# Patient Record
Sex: FEMALE | Race: BLACK OR AFRICAN AMERICAN | NOT HISPANIC OR LATINO | URBAN - METROPOLITAN AREA
[De-identification: names, ages, dates, MRNs, and addresses within clinical notes are randomized per-mention and may not be internally consistent; named-entity substitution may affect disease eponyms.]

---

## 2021-12-20 ENCOUNTER — INPATIENT (INPATIENT)
Facility: HOSPITAL | Age: 61
LOS: 1 days | Discharge: HOME | DRG: 313 | End: 2021-12-22
Attending: HOSPITALIST | Admitting: HOSPITALIST
Payer: MEDICAID

## 2021-12-20 VITALS
DIASTOLIC BLOOD PRESSURE: 89 MMHG | HEART RATE: 101 BPM | SYSTOLIC BLOOD PRESSURE: 137 MMHG | OXYGEN SATURATION: 99 % | HEIGHT: 64 IN | TEMPERATURE: 98 F | RESPIRATION RATE: 20 BRPM | WEIGHT: 199.96 LBS

## 2021-12-20 DIAGNOSIS — Z98.890 OTHER SPECIFIED POSTPROCEDURAL STATES: Chronic | ICD-10-CM

## 2021-12-20 LAB
ALBUMIN SERPL ELPH-MCNC: 4.6 G/DL — SIGNIFICANT CHANGE UP (ref 3.3–5)
ALP SERPL-CCNC: 113 U/L — SIGNIFICANT CHANGE UP (ref 40–120)
ALT FLD-CCNC: 40 U/L — SIGNIFICANT CHANGE UP (ref 10–45)
ANION GAP SERPL CALC-SCNC: 14 MMOL/L — SIGNIFICANT CHANGE UP (ref 5–17)
APTT BLD: 34.3 SEC — SIGNIFICANT CHANGE UP (ref 27.5–35.5)
AST SERPL-CCNC: 36 U/L — SIGNIFICANT CHANGE UP (ref 10–40)
BASOPHILS # BLD AUTO: 0.03 K/UL — SIGNIFICANT CHANGE UP (ref 0–0.2)
BASOPHILS NFR BLD AUTO: 0.4 % — SIGNIFICANT CHANGE UP (ref 0–2)
BILIRUB SERPL-MCNC: 0.2 MG/DL — SIGNIFICANT CHANGE UP (ref 0.2–1.2)
BUN SERPL-MCNC: 22 MG/DL — SIGNIFICANT CHANGE UP (ref 7–23)
CALCIUM SERPL-MCNC: 10 MG/DL — SIGNIFICANT CHANGE UP (ref 8.4–10.5)
CHLORIDE SERPL-SCNC: 99 MMOL/L — SIGNIFICANT CHANGE UP (ref 96–108)
CO2 SERPL-SCNC: 27 MMOL/L — SIGNIFICANT CHANGE UP (ref 22–31)
CREAT SERPL-MCNC: 1.42 MG/DL — HIGH (ref 0.5–1.3)
EOSINOPHIL # BLD AUTO: 0.22 K/UL — SIGNIFICANT CHANGE UP (ref 0–0.5)
EOSINOPHIL NFR BLD AUTO: 3 % — SIGNIFICANT CHANGE UP (ref 0–6)
GLUCOSE SERPL-MCNC: 95 MG/DL — SIGNIFICANT CHANGE UP (ref 70–99)
HCT VFR BLD CALC: 38.3 % — SIGNIFICANT CHANGE UP (ref 34.5–45)
HGB BLD-MCNC: 12.4 G/DL — SIGNIFICANT CHANGE UP (ref 11.5–15.5)
IMM GRANULOCYTES NFR BLD AUTO: 0.3 % — SIGNIFICANT CHANGE UP (ref 0–1.5)
INR BLD: 1.05 RATIO — SIGNIFICANT CHANGE UP (ref 0.88–1.16)
LYMPHOCYTES # BLD AUTO: 1.43 K/UL — SIGNIFICANT CHANGE UP (ref 1–3.3)
LYMPHOCYTES # BLD AUTO: 19.8 % — SIGNIFICANT CHANGE UP (ref 13–44)
MCHC RBC-ENTMCNC: 31.2 PG — SIGNIFICANT CHANGE UP (ref 27–34)
MCHC RBC-ENTMCNC: 32.4 GM/DL — SIGNIFICANT CHANGE UP (ref 32–36)
MCV RBC AUTO: 96.5 FL — SIGNIFICANT CHANGE UP (ref 80–100)
MONOCYTES # BLD AUTO: 0.6 K/UL — SIGNIFICANT CHANGE UP (ref 0–0.9)
MONOCYTES NFR BLD AUTO: 8.3 % — SIGNIFICANT CHANGE UP (ref 2–14)
NEUTROPHILS # BLD AUTO: 4.93 K/UL — SIGNIFICANT CHANGE UP (ref 1.8–7.4)
NEUTROPHILS NFR BLD AUTO: 68.2 % — SIGNIFICANT CHANGE UP (ref 43–77)
NRBC # BLD: 0 /100 WBCS — SIGNIFICANT CHANGE UP (ref 0–0)
PLATELET # BLD AUTO: 230 K/UL — SIGNIFICANT CHANGE UP (ref 150–400)
POTASSIUM SERPL-MCNC: 4.1 MMOL/L — SIGNIFICANT CHANGE UP (ref 3.5–5.3)
POTASSIUM SERPL-SCNC: 4.1 MMOL/L — SIGNIFICANT CHANGE UP (ref 3.5–5.3)
PROT SERPL-MCNC: 7.5 G/DL — SIGNIFICANT CHANGE UP (ref 6–8.3)
PROTHROM AB SERPL-ACNC: 12.6 SEC — SIGNIFICANT CHANGE UP (ref 10.6–13.6)
RBC # BLD: 3.97 M/UL — SIGNIFICANT CHANGE UP (ref 3.8–5.2)
RBC # FLD: 13.2 % — SIGNIFICANT CHANGE UP (ref 10.3–14.5)
SARS-COV-2 RNA SPEC QL NAA+PROBE: SIGNIFICANT CHANGE UP
SODIUM SERPL-SCNC: 140 MMOL/L — SIGNIFICANT CHANGE UP (ref 135–145)
TROPONIN T, HIGH SENSITIVITY RESULT: 19 NG/L — SIGNIFICANT CHANGE UP (ref 0–51)
TROPONIN T, HIGH SENSITIVITY RESULT: 19 NG/L — SIGNIFICANT CHANGE UP (ref 0–51)
WBC # BLD: 7.23 K/UL — SIGNIFICANT CHANGE UP (ref 3.8–10.5)
WBC # FLD AUTO: 7.23 K/UL — SIGNIFICANT CHANGE UP (ref 3.8–10.5)

## 2021-12-20 PROCEDURE — 93010 ELECTROCARDIOGRAM REPORT: CPT

## 2021-12-20 PROCEDURE — 71045 X-RAY EXAM CHEST 1 VIEW: CPT | Mod: 26

## 2021-12-20 PROCEDURE — 71275 CT ANGIOGRAPHY CHEST: CPT | Mod: 26,MA

## 2021-12-20 PROCEDURE — 99285 EMERGENCY DEPT VISIT HI MDM: CPT

## 2021-12-20 RX ORDER — DIPHENHYDRAMINE HCL 50 MG
50 CAPSULE ORAL ONCE
Refills: 0 | Status: COMPLETED | OUTPATIENT
Start: 2021-12-20 | End: 2021-12-20

## 2021-12-20 RX ORDER — ASPIRIN/CALCIUM CARB/MAGNESIUM 324 MG
324 TABLET ORAL ONCE
Refills: 0 | Status: COMPLETED | OUTPATIENT
Start: 2021-12-20 | End: 2021-12-20

## 2021-12-20 RX ADMIN — Medication 50 MILLIGRAM(S): at 19:55

## 2021-12-20 RX ADMIN — Medication 324 MILLIGRAM(S): at 16:43

## 2021-12-20 RX ADMIN — Medication 40 MILLIGRAM(S): at 16:38

## 2021-12-20 NOTE — ED ADULT TRIAGE NOTE - CHIEF COMPLAINT QUOTE
palpitations 2 days ago. went to urgent care today & instructed to come. Seen & checked in [facility] last night.

## 2021-12-20 NOTE — ED PROVIDER NOTE - ATTENDING CONTRIBUTION TO CARE
Private Physician Abram Khan  61y female pmh  HTN, Asthma, Lung Ca 2020 sp resection/rt/chemo. No dm,hld, PT comes to ed c/o palps and chest pain during night two days ago rad to rt shoulder and arm, Lasted 3h w shortness of breath, with slight cough no fever, +diaphoresis. Pt seen at OSH and left before gutierrez complete. No feels Ok. PE WDWN female awake alert normocephalic atraumatic neck supple chest clear anterior & posterior cv no rubs, gallops or murmurs abd soft +bs neuro no focal defects  Doyle Cintron MD, Facep

## 2021-12-20 NOTE — ED PROVIDER NOTE - PROGRESS NOTE DETAILS
Endorsed to Dr LAURI Cintron MD, Facep Patient signed out to me by the prior attending.  The patient's disposition was discussed with the treating team and agreed upon.  Doyle Alegria M.D. (attending) will admit secondary to chest pain and sweating with ekg changes.   Patient endorsed to the medical team at the time of admission. Based on patient's history and physical exam, as well as the results of today's workup, I feel that patient warrants admission to the hospital for further workup/evaluation and continued management. I discussed the findings of today's workup with the patient and addressed the patient's questions and concerns. The patient was agreeable with admission. Our team spoke with the inpatient receiving team who accepted the patient for admission and subsequently took over the patient's care at the time of admission. The receiving team will follow up on pending labs, analgesia, any clinical imaging results, ancillary findings, reassess, and disposition as clinically indicated. Details of patient and plan conveyed to receiving physician team and conveyed back for understanding. There were no questions at this time about the patient's status, disposition, and plan. Patient's care to be taken over by receiving physician team at this time, all decisions regarding the progression of care will be made at their discretion.

## 2021-12-20 NOTE — ED ADULT NURSE NOTE - CHIEF COMPLAINT QUOTE
palpitations 2 days ago. went to urgent care today & instructed to come. Seen & checked in [facility] last night.

## 2021-12-20 NOTE — ED PROVIDER NOTE - NS ED ROS FT
GENERAL: No fever, chills  EYES: no vision changes, no discharge.   ENT: no difficulty swallowing or speaking   CARDIAC: + chest pain, palpitations, + SOB, lower extremity swelling  PULMONARY: + cough, +SOB  GI: no abdominal pain, n/v/d  : no dysuria, no hematuria  SKIN: no rashes, no ecchymosis  NEURO: no headache, lightheadedness  MSK: No joint pain, myalgia, weakness.

## 2021-12-20 NOTE — ED PROVIDER NOTE - CLINICAL SUMMARY MEDICAL DECISION MAKING FREE TEXT BOX
62 yo F hx of HTN, HLD, Asthma, lung CA sp resection and chemo, pw palpitations and chest pain that woke her up from sleep two nights ago. No significant on exam, but concerning for ACS vs PE, lower suspicion for PNA, COVID. Due to contrast allergy, will pre-medicate and obtain CTA chest. Likely tele admission vs obs for further cardiac testing.

## 2021-12-20 NOTE — ED ADULT NURSE NOTE - OBJECTIVE STATEMENT
61 yr old female amb to Ed with c/o palpitations x 2 days ago Pt says she was asleep and that the palpitations woke her up. C/o feeling clammy with min rt sided chest pain under rt breast rad rad rt shoulder. Has not had an episode since.  Denies smoking or etoh has hx Lung CA 2020 with surgery radiation and chemo. Has hx Asthma Was seen that day at urgent care had abnorm EKG went to [facility] and did not stay for CT of chest. Denies abd pain denies burn or diff urinating. Vaccinatedx2 has not had covid Denies recent travel.

## 2021-12-20 NOTE — ED PROVIDER NOTE - OBJECTIVE STATEMENT
60 yo F hx of HTN, HLD, Asthma, lung CA sp resection and chemo, pw palpitations and chest pain that woke her up from sleep two nights ago. CP was severe, sharp, radiated to right shoulder, associated with SOB, diaphoresis, self resolved after 3 hours. Pt endorse mild cough. Pt denies fever, chills, Gi, Gu symptoms. Pt was seen at outside ED, but left AMA.

## 2021-12-20 NOTE — ED PROVIDER NOTE - PHYSICAL EXAMINATION
GEN: Patient awake alert NAD.   HEENT: normocephalic, atraumatic, EOMI, no scleral icterus  CARDIAC: RRR, S1, S2, no murmur.   PULM: CTA B/L no wheeze, rhonchi, rales.   ABD: soft NT, ND, no rebound no guarding  MSK: Moving all extremities, no edema. 5/5 strength and full ROM in all extremities.   NEURO: A&Ox3, no focal neurological deficits, CN 2-12 grossly intact  SKIN: warm, dry, no rash.

## 2021-12-21 DIAGNOSIS — I20.0 UNSTABLE ANGINA: ICD-10-CM

## 2021-12-21 DIAGNOSIS — N17.9 ACUTE KIDNEY FAILURE, UNSPECIFIED: ICD-10-CM

## 2021-12-21 DIAGNOSIS — R07.9 CHEST PAIN, UNSPECIFIED: ICD-10-CM

## 2021-12-21 DIAGNOSIS — R09.89 OTHER SPECIFIED SYMPTOMS AND SIGNS INVOLVING THE CIRCULATORY AND RESPIRATORY SYSTEMS: ICD-10-CM

## 2021-12-21 DIAGNOSIS — J45.909 UNSPECIFIED ASTHMA, UNCOMPLICATED: ICD-10-CM

## 2021-12-21 DIAGNOSIS — I10 ESSENTIAL (PRIMARY) HYPERTENSION: ICD-10-CM

## 2021-12-21 DIAGNOSIS — C34.90 MALIGNANT NEOPLASM OF UNSPECIFIED PART OF UNSPECIFIED BRONCHUS OR LUNG: ICD-10-CM

## 2021-12-21 DIAGNOSIS — Z29.9 ENCOUNTER FOR PROPHYLACTIC MEASURES, UNSPECIFIED: ICD-10-CM

## 2021-12-21 LAB
ALBUMIN SERPL ELPH-MCNC: 4.1 G/DL — SIGNIFICANT CHANGE UP (ref 3.3–5)
ALP SERPL-CCNC: 105 U/L — SIGNIFICANT CHANGE UP (ref 40–120)
ALT FLD-CCNC: 32 U/L — SIGNIFICANT CHANGE UP (ref 10–45)
ANION GAP SERPL CALC-SCNC: 14 MMOL/L — SIGNIFICANT CHANGE UP (ref 5–17)
AST SERPL-CCNC: 28 U/L — SIGNIFICANT CHANGE UP (ref 10–40)
BASOPHILS # BLD AUTO: 0 K/UL — SIGNIFICANT CHANGE UP (ref 0–0.2)
BASOPHILS NFR BLD AUTO: 0 % — SIGNIFICANT CHANGE UP (ref 0–2)
BILIRUB SERPL-MCNC: 0.2 MG/DL — SIGNIFICANT CHANGE UP (ref 0.2–1.2)
BUN SERPL-MCNC: 21 MG/DL — SIGNIFICANT CHANGE UP (ref 7–23)
CALCIUM SERPL-MCNC: 9.4 MG/DL — SIGNIFICANT CHANGE UP (ref 8.4–10.5)
CHLORIDE SERPL-SCNC: 99 MMOL/L — SIGNIFICANT CHANGE UP (ref 96–108)
CO2 SERPL-SCNC: 23 MMOL/L — SIGNIFICANT CHANGE UP (ref 22–31)
CREAT SERPL-MCNC: 1.22 MG/DL — SIGNIFICANT CHANGE UP (ref 0.5–1.3)
EOSINOPHIL # BLD AUTO: 0 K/UL — SIGNIFICANT CHANGE UP (ref 0–0.5)
EOSINOPHIL NFR BLD AUTO: 0 % — SIGNIFICANT CHANGE UP (ref 0–6)
GLUCOSE SERPL-MCNC: 163 MG/DL — HIGH (ref 70–99)
HCT VFR BLD CALC: 34.2 % — LOW (ref 34.5–45)
HGB BLD-MCNC: 10.8 G/DL — LOW (ref 11.5–15.5)
IMM GRANULOCYTES NFR BLD AUTO: 0.3 % — SIGNIFICANT CHANGE UP (ref 0–1.5)
LYMPHOCYTES # BLD AUTO: 0.54 K/UL — LOW (ref 1–3.3)
LYMPHOCYTES # BLD AUTO: 7.3 % — LOW (ref 13–44)
MAGNESIUM SERPL-MCNC: 1.9 MG/DL — SIGNIFICANT CHANGE UP (ref 1.6–2.6)
MCHC RBC-ENTMCNC: 30.3 PG — SIGNIFICANT CHANGE UP (ref 27–34)
MCHC RBC-ENTMCNC: 31.6 GM/DL — LOW (ref 32–36)
MCV RBC AUTO: 96.1 FL — SIGNIFICANT CHANGE UP (ref 80–100)
MONOCYTES # BLD AUTO: 0.18 K/UL — SIGNIFICANT CHANGE UP (ref 0–0.9)
MONOCYTES NFR BLD AUTO: 2.4 % — SIGNIFICANT CHANGE UP (ref 2–14)
NEUTROPHILS # BLD AUTO: 6.66 K/UL — SIGNIFICANT CHANGE UP (ref 1.8–7.4)
NEUTROPHILS NFR BLD AUTO: 90 % — HIGH (ref 43–77)
NRBC # BLD: 0 /100 WBCS — SIGNIFICANT CHANGE UP (ref 0–0)
NT-PROBNP SERPL-SCNC: 364 PG/ML — HIGH (ref 0–300)
PLATELET # BLD AUTO: 212 K/UL — SIGNIFICANT CHANGE UP (ref 150–400)
POTASSIUM SERPL-MCNC: 4.2 MMOL/L — SIGNIFICANT CHANGE UP (ref 3.5–5.3)
POTASSIUM SERPL-SCNC: 4.2 MMOL/L — SIGNIFICANT CHANGE UP (ref 3.5–5.3)
PROT SERPL-MCNC: 7 G/DL — SIGNIFICANT CHANGE UP (ref 6–8.3)
RBC # BLD: 3.56 M/UL — LOW (ref 3.8–5.2)
RBC # FLD: 13.3 % — SIGNIFICANT CHANGE UP (ref 10.3–14.5)
SODIUM SERPL-SCNC: 136 MMOL/L — SIGNIFICANT CHANGE UP (ref 135–145)
TSH SERPL-MCNC: 0.71 UIU/ML — SIGNIFICANT CHANGE UP (ref 0.27–4.2)
WBC # BLD: 7.4 K/UL — SIGNIFICANT CHANGE UP (ref 3.8–10.5)
WBC # FLD AUTO: 7.4 K/UL — SIGNIFICANT CHANGE UP (ref 3.8–10.5)

## 2021-12-21 PROCEDURE — 78452 HT MUSCLE IMAGE SPECT MULT: CPT | Mod: 26

## 2021-12-21 PROCEDURE — 99223 1ST HOSP IP/OBS HIGH 75: CPT

## 2021-12-21 PROCEDURE — 12345: CPT | Mod: NC

## 2021-12-21 PROCEDURE — 93018 CV STRESS TEST I&R ONLY: CPT

## 2021-12-21 PROCEDURE — 93306 TTE W/DOPPLER COMPLETE: CPT | Mod: 26

## 2021-12-21 PROCEDURE — 93016 CV STRESS TEST SUPVJ ONLY: CPT

## 2021-12-21 RX ORDER — HEPARIN SODIUM 5000 [USP'U]/ML
5000 INJECTION INTRAVENOUS; SUBCUTANEOUS EVERY 12 HOURS
Refills: 0 | Status: DISCONTINUED | OUTPATIENT
Start: 2021-12-21 | End: 2021-12-22

## 2021-12-21 RX ORDER — LANOLIN ALCOHOL/MO/W.PET/CERES
3 CREAM (GRAM) TOPICAL AT BEDTIME
Refills: 0 | Status: DISCONTINUED | OUTPATIENT
Start: 2021-12-21 | End: 2021-12-22

## 2021-12-21 RX ORDER — ACETAMINOPHEN 500 MG
650 TABLET ORAL EVERY 6 HOURS
Refills: 0 | Status: DISCONTINUED | OUTPATIENT
Start: 2021-12-21 | End: 2021-12-22

## 2021-12-21 RX ORDER — ALBUTEROL 90 UG/1
2 AEROSOL, METERED ORAL
Qty: 0 | Refills: 0 | DISCHARGE

## 2021-12-21 RX ADMIN — HEPARIN SODIUM 5000 UNIT(S): 5000 INJECTION INTRAVENOUS; SUBCUTANEOUS at 17:13

## 2021-12-21 RX ADMIN — HEPARIN SODIUM 5000 UNIT(S): 5000 INJECTION INTRAVENOUS; SUBCUTANEOUS at 06:07

## 2021-12-21 NOTE — H&P ADULT - PROBLEM SELECTOR PLAN 2
Unclear baseline renal function. Pt not aware of having CKD. Unclear baseline renal function. Pt not aware of having CKD.  -Holding ACE and HCTZ for now  -Trend BMP  -Renal dose meds  -Will send UA

## 2021-12-21 NOTE — PATIENT PROFILE ADULT - FALL HARM RISK - UNIVERSAL INTERVENTIONS
Bed in lowest position, wheels locked, appropriate side rails in place/Call bell, personal items and telephone in reach/Instruct patient to call for assistance before getting out of bed or chair/Non-slip footwear when patient is out of bed/[place] to call system/Physically safe environment - no spills, clutter or unnecessary equipment/Purposeful Proactive Rounding/Room/bathroom lighting operational, light cord in reach

## 2021-12-21 NOTE — H&P ADULT - NSHPLABSRESULTS_GEN_ALL_CORE
I have reviewed the labs, imaging and ekg. EKG with NSR HR 96, QTc 515, TWI anteriorly, flat in lateral leads. CXR w/o focal consolidations, medport in R upper chest

## 2021-12-21 NOTE — H&P ADULT - NSHPPHYSICALEXAM_GEN_ALL_CORE
Vital Signs Last 24 Hrs  T(C): 36.8 (12-21-21 @ 00:08), Max: 36.8 (12-21-21 @ 00:08)  T(F): 98.2 (12-21-21 @ 00:08), Max: 98.2 (12-21-21 @ 00:08)  HR: 92 (12-21-21 @ 00:08) (92 - 101)  BP: 112/75 (12-21-21 @ 00:08) (112/75 - 137/89)  BP(mean): --  RR: 18 (12-21-21 @ 00:08) (18 - 20)  SpO2: 96% (12-21-21 @ 00:08) (96% - 99%)

## 2021-12-21 NOTE — H&P ADULT - PROBLEM SELECTOR PLAN 1
Note TWIs, also present on EKG in pt's purse. CTA negative for PE. Given no outpt cardiologist and risk for CAD given presentation will tentatively plan for further cardiac risk stratification  -Telemetry  -TTE  -Nuclear pharmacologic stress test ordered  -F/u TSH

## 2021-12-21 NOTE — H&P ADULT - NSICDXPASTMEDICALHX_GEN_ALL_CORE_FT
PAST MEDICAL HISTORY:  Asthma     COVID-19 vaccine series completed Moderna    HTN (hypertension)     Lung cancer

## 2021-12-21 NOTE — H&P ADULT - HISTORY OF PRESENT ILLNESS
61F w/ hx of Lung CA s/p resection, and chemo, asthma, HTN, HLD p/w palpitations and atypical chest pain. Pt states she has been having intermittent chills overnight over the past several months. Also needing inhaler more with cold weather before going outside. Roughly 3 days ago pt woke up in the middle of the night with severe palpitations and R sided chest discomfort radiating down R arm. Symptoms lasted for roughly 3 hours. Went to [facility] but left AMA when she was not evaluated after 8 hrs. At advice of family, pt came to [facility] ER to evaluate prior symptoms. Of note, pt states she underwent TTE and stress test as part of pre-surgical work up Jan 2020 before undergoing Lung CA surgery. Endorses unchanged chronic cough. Last travel was to [place] in October. Does not having primary cardiologist. Denies any LE edema or fevers.     In ER: Given Benadryl 50mg and solumedrol 40mg IV, asa 324mg

## 2021-12-21 NOTE — H&P ADULT - NSHPOUTPATIENTPROVIDERS_GEN_ALL_CORE
Dr. Abram Ocampo PMD  Dr. Antoine Staton Pulm [facility]  Dr. Abram Ocampo PMD  Dr. Antoine Staton Pulm

## 2021-12-21 NOTE — PATIENT PROFILE ADULT - CAREGIVER ADDRESS
[address]  [facility]3]  [id number]  apt1D

## 2021-12-22 VITALS
SYSTOLIC BLOOD PRESSURE: 129 MMHG | DIASTOLIC BLOOD PRESSURE: 88 MMHG | RESPIRATION RATE: 18 BRPM | HEART RATE: 93 BPM | OXYGEN SATURATION: 95 % | TEMPERATURE: 98 F

## 2021-12-22 DIAGNOSIS — R07.9 CHEST PAIN, UNSPECIFIED: ICD-10-CM

## 2021-12-22 LAB
ANION GAP SERPL CALC-SCNC: 14 MMOL/L — SIGNIFICANT CHANGE UP (ref 5–17)
BUN SERPL-MCNC: 25 MG/DL — HIGH (ref 7–23)
CALCIUM SERPL-MCNC: 9.4 MG/DL — SIGNIFICANT CHANGE UP (ref 8.4–10.5)
CHLORIDE SERPL-SCNC: 101 MMOL/L — SIGNIFICANT CHANGE UP (ref 96–108)
CO2 SERPL-SCNC: 25 MMOL/L — SIGNIFICANT CHANGE UP (ref 22–31)
CREAT SERPL-MCNC: 1.19 MG/DL — SIGNIFICANT CHANGE UP (ref 0.5–1.3)
FERRITIN SERPL-MCNC: 154 NG/ML — HIGH (ref 15–150)
FOLATE SERPL-MCNC: >20 NG/ML — SIGNIFICANT CHANGE UP
GLUCOSE SERPL-MCNC: 73 MG/DL — SIGNIFICANT CHANGE UP (ref 70–99)
HCT VFR BLD CALC: 37.5 % — SIGNIFICANT CHANGE UP (ref 34.5–45)
HCV AB S/CO SERPL IA: 0.08 S/CO — SIGNIFICANT CHANGE UP (ref 0–0.99)
HCV AB SERPL-IMP: SIGNIFICANT CHANGE UP
HGB BLD-MCNC: 12.2 G/DL — SIGNIFICANT CHANGE UP (ref 11.5–15.5)
IRON SATN MFR SERPL: 21 % — SIGNIFICANT CHANGE UP (ref 14–50)
IRON SATN MFR SERPL: 60 UG/DL — SIGNIFICANT CHANGE UP (ref 30–160)
MCHC RBC-ENTMCNC: 31.2 PG — SIGNIFICANT CHANGE UP (ref 27–34)
MCHC RBC-ENTMCNC: 32.5 GM/DL — SIGNIFICANT CHANGE UP (ref 32–36)
MCV RBC AUTO: 95.9 FL — SIGNIFICANT CHANGE UP (ref 80–100)
NRBC # BLD: 0 /100 WBCS — SIGNIFICANT CHANGE UP (ref 0–0)
PLATELET # BLD AUTO: 219 K/UL — SIGNIFICANT CHANGE UP (ref 150–400)
POTASSIUM SERPL-MCNC: 3.6 MMOL/L — SIGNIFICANT CHANGE UP (ref 3.5–5.3)
POTASSIUM SERPL-SCNC: 3.6 MMOL/L — SIGNIFICANT CHANGE UP (ref 3.5–5.3)
RBC # BLD: 3.91 M/UL — SIGNIFICANT CHANGE UP (ref 3.8–5.2)
RBC # BLD: 3.91 M/UL — SIGNIFICANT CHANGE UP (ref 3.8–5.2)
RBC # FLD: 13.5 % — SIGNIFICANT CHANGE UP (ref 10.3–14.5)
RETICS #: 76.2 K/UL — SIGNIFICANT CHANGE UP (ref 25–125)
RETICS/RBC NFR: 2 % — SIGNIFICANT CHANGE UP (ref 0.5–2.5)
SARS-COV-2 RNA SPEC QL NAA+PROBE: SIGNIFICANT CHANGE UP
SODIUM SERPL-SCNC: 140 MMOL/L — SIGNIFICANT CHANGE UP (ref 135–145)
TIBC SERPL-MCNC: 280 UG/DL — SIGNIFICANT CHANGE UP (ref 220–430)
UIBC SERPL-MCNC: 220 UG/DL — SIGNIFICANT CHANGE UP (ref 110–370)
VIT B12 SERPL-MCNC: 722 PG/ML — SIGNIFICANT CHANGE UP (ref 232–1245)
WBC # BLD: 7.14 K/UL — SIGNIFICANT CHANGE UP (ref 3.8–10.5)
WBC # FLD AUTO: 7.14 K/UL — SIGNIFICANT CHANGE UP (ref 3.8–10.5)

## 2021-12-22 PROCEDURE — 99255 IP/OBS CONSLTJ NEW/EST HI 80: CPT

## 2021-12-22 PROCEDURE — 99239 HOSP IP/OBS DSCHRG MGMT >30: CPT

## 2021-12-22 RX ORDER — ACETAMINOPHEN 500 MG
2 TABLET ORAL
Qty: 0 | Refills: 0 | DISCHARGE
Start: 2021-12-22

## 2021-12-22 RX ORDER — BUDESONIDE AND FORMOTEROL FUMARATE DIHYDRATE 160; 4.5 UG/1; UG/1
2 AEROSOL RESPIRATORY (INHALATION)
Qty: 120 | Refills: 0
Start: 2021-12-22 | End: 2022-01-20

## 2021-12-22 RX ORDER — BUDESONIDE AND FORMOTEROL FUMARATE DIHYDRATE 160; 4.5 UG/1; UG/1
1 AEROSOL RESPIRATORY (INHALATION)
Qty: 1 | Refills: 0
Start: 2021-12-22 | End: 2022-01-20

## 2021-12-22 RX ORDER — ACETAMINOPHEN 500 MG
0 TABLET ORAL
Qty: 0 | Refills: 0 | DISCHARGE

## 2021-12-22 RX ORDER — LISINOPRIL 2.5 MG/1
1 TABLET ORAL
Qty: 0 | Refills: 0 | DISCHARGE

## 2021-12-22 RX ORDER — BUDESONIDE AND FORMOTEROL FUMARATE DIHYDRATE 160; 4.5 UG/1; UG/1
2 AEROSOL RESPIRATORY (INHALATION)
Refills: 0 | Status: DISCONTINUED | OUTPATIENT
Start: 2021-12-22 | End: 2021-12-22

## 2021-12-22 RX ADMIN — BUDESONIDE AND FORMOTEROL FUMARATE DIHYDRATE 2 PUFF(S): 160; 4.5 AEROSOL RESPIRATORY (INHALATION) at 17:52

## 2021-12-22 RX ADMIN — HEPARIN SODIUM 5000 UNIT(S): 5000 INJECTION INTRAVENOUS; SUBCUTANEOUS at 05:50

## 2021-12-22 NOTE — DISCHARGE NOTE PROVIDER - HOSPITAL COURSE
62yo F w/ hx of Lung CA s/p resection, and chemo, asthma, HTN, HLD p/w palpitations and atypical chest pain, cta negative for PE, acs ruled out with negative trops, tte normal, stress test likely normal for discharge home with outpatient follow up for holter monitor per cardiology.  Her blood pressure was controlled OFF medications and her BERNADETTE resolved with holding her ace/hctz, which are being held on discharge as well.  She also c/o asthma related cough, no acute findings on CTA, covid negative on admission, started on pulmicort and will need to f/u with her pulmonologist.  No acute exacerbation at this time. 60yo F w/ hx of Lung CA s/p resection, and chemo, asthma, HTN, HLD p/w palpitations and atypical chest pain, cta negative for PE, acs ruled out with negative trops, tte normal, stress test likely normal for discharge home with outpatient follow up for holter monitor per cardiology. Her blood pressure was controlled OFF medications and her BERNADETTE resolved with holding her ace/hctz, which are being held on discharge as well.  She also c/o asthma related cough, no acute findings on CTA, covid negative on admission, started on pulmicort and will need to f/u with her pulmonologist.  No acute exacerbation at this time.  Pt medically stable for discharge with follow up with her PCP , pulmonologist , and Cardiologist.

## 2021-12-22 NOTE — DISCHARGE NOTE PROVIDER - NSDCMRMEDTOKEN_GEN_ALL_CORE_FT
acetaminophen 650 mg oral tablet:   Albuterol (Eqv-ProAir HFA) 90 mcg/inh inhalation aerosol: 2 puff(s) inhaled every 6 hours, As Needed  hydroCHLOROthiazide 25 mg oral tablet: 1 tab(s) orally once a day  lisinopril 40 mg oral tablet: 1 tab(s) orally once a day   acetaminophen 325 mg oral tablet: 2 tab(s) orally every 6 hours, As needed, Temp greater or equal to 38C (100.4F), Mild Pain (1 - 3)  Albuterol (Eqv-ProAir HFA) 90 mcg/inh inhalation aerosol: 2 puff(s) inhaled every 6 hours, As Needed  budesonide-formoterol 80 mcg-4.5 mcg/inh inhalation aerosol: 2 puff(s) inhaled 2 times a day

## 2021-12-22 NOTE — PROGRESS NOTE ADULT - PROBLEM SELECTOR PLAN 6
DVT PPx  -Hep subq  anticipate d/c home.  will covid test to determine isolation needs on discharge given new cough but overall she is non toxic afebrile and saturating well on room air.  Discharge time: 35min. Reviewed with ACP Antonio
DVT PPx  -Hep subq  anticipate d/c home if stress is negative

## 2021-12-22 NOTE — DISCHARGE NOTE PROVIDER - CARE PROVIDER_API CALL
ROBBY REDDY  Internal Medicine  BAHMAN BUSTAMANTE, Phys,    Phone: [phone]  Fax: ([phone]  Follow Up Time:

## 2021-12-22 NOTE — CONSULT NOTE ADULT - ASSESSMENT
62 yo F w/ PMH Lung cancer s/p resection and chemo, HTN, HLD, astham who presented wtih chest pain and palpitations    #Chest pain   #Palpitations   EKG nonrevealing. TTE unrevealing. Nuclear stress test showed:   * The left ventricle was small in size. There is a small,  mild defect in mid to distal inferolateral wall that is  partially reversible, consistent infarction with mild  janett-infarct ischemia. However, The observed defect(s)  partially correct(s) with prone imaging with no obvious  wall motion abnormalities suggestive of artifact.  * Post-stress gated wall motion analysis was performed"     Nuclear stress test likely artifact induced rather than true ischemia, would no warrant further testing at this time     Tele did show 3 beats of vetricular trigeminny overnight     -Recommend outpatient monitor to assess her palpitations  60 yo F w/ PMH Lung cancer s/p resection and chemo, HTN, HLD, astham who presented wtih chest pain and palpitations    #Chest pain   #Palpitations   EKG nonrevealing. TTE unrevealing. Nuclear stress test showed:   * The left ventricle was small in size. There is a small,  mild defect in mid to distal inferolateral wall that is  partially reversible, consistent infarction with mild  janett-infarct ischemia. However, The observed defect(s)  partially correct(s) with prone imaging with no obvious  wall motion abnormalities suggestive of artifact.  * Post-stress gated wall motion analysis was performed"     Nuclear stress test likely artifact induced rather than true ischemia, would no warrant further testing at this time     Tele did show 3 beats of vetricular trigeminny overnight     -Recommend outpatient monitor to assess her palpitations     Will sign off, please call for any questions

## 2021-12-22 NOTE — DISCHARGE NOTE PROVIDER - NSDCCPCAREPLAN_GEN_ALL_CORE_FT
PRINCIPAL DISCHARGE DIAGNOSIS  Diagnosis: Chest pain in adult  Assessment and Plan of Treatment: Nuclear stress test likely artifact induced rather than true ischemia, would no warrant further testing at this time  Per Cardiology - Recommend outpatient monitor to assess her palpitations      SECONDARY DISCHARGE DIAGNOSES  Diagnosis: BERNADETTE (acute kidney injury)  Assessment and Plan of Treatment:     Diagnosis: Asthma  Assessment and Plan of Treatment:     Diagnosis: Lung cancer  Assessment and Plan of Treatment:      PRINCIPAL DISCHARGE DIAGNOSIS  Diagnosis: Chest pain in adult  Assessment and Plan of Treatment: Nuclear stress test likely artifact induced rather than true ischemia, would no warrant further testing at this time  Per Cardiology - Recommend outpatient monitor to assess her palpitations- ( Please follow up with CARDIOLOGY Clinic - number is provided under follow up)      SECONDARY DISCHARGE DIAGNOSES  Diagnosis: BERNADETTE (acute kidney injury)  Assessment and Plan of Treatment: Unclear baseline renal function. Pt not aware of having chronic kidney discease.   -Holding lininopril  and HCTZ for now. Please follow up       Diagnosis: Asthma  Assessment and Plan of Treatment: Please follow up with your Pulmonologist. Continue to take inhaler as prescribe

## 2021-12-22 NOTE — DISCHARGE NOTE NURSING/CASE MANAGEMENT/SOCIAL WORK - PATIENT PORTAL LINK FT
You can access the FollowMyHealth Patient Portal offered by [facility] by registering at the following website: [email or web address]. By joining [facility]’s FollowMyHealth portal, you will also be able to view your health information using other applications (apps) compatible with our system.

## 2021-12-22 NOTE — PROGRESS NOTE ADULT - ASSESSMENT
62yo F w/ hx of Lung CA s/p resection, and chemo, asthma, HTN, HLD p/w palpitations and atypical chest pain cta negative for PE, acs ruled out with negative trops, tte normal, stress test likely normal for discharge home pending repeat covid test.
60yo F w/ hx of Lung CA s/p resection, and chemo, asthma, HTN, HLD p/w palpitations and atypical chest pain cta negative for PE, acs ruled out with negative trops, tte normal, awaiting stress test for discharge home.

## 2021-12-22 NOTE — PROGRESS NOTE ADULT - PROBLEM SELECTOR PLAN 5
BP seems well controlled  -Trend vital signs  -Holding HCTZ and ACE for now due to creatinine and unclear baseline
BP seems well controlled OFF meds  -Hold HCTZ and ACE on discharge as she is low normal.  Per patient recently retired and stress level is dramatically reduced  -instructed patient to f/u with outpatient pcp prior to resuming meds

## 2021-12-22 NOTE — DISCHARGE NOTE NURSING/CASE MANAGEMENT/SOCIAL WORK - NSDCPEFALRISK_GEN_ALL_CORE
For information on Fall & Injury Prevention, visit: [email or web address] OR  [email or web address] OR  https://www.cdc.gov/steadi/patient.html

## 2021-12-22 NOTE — CONSULT NOTE ADULT - ATTENDING COMMENTS
Palpitations.   Artifact on stress.  OK to d/c                                                                                                                                                                     Sixty Minutes spent in direct patient care and communication

## 2021-12-22 NOTE — CONSULT NOTE ADULT - SUBJECTIVE AND OBJECTIVE BOX
Patient seen and evaluated at bedside    Chief Complaint:    HPI:  61F w/ hx of Lung CA s/p resection, and chemo, asthma, HTN, HLD p/w palpitations and atypical chest pain. Pt states she has been having intermittent chills overnight over the past several months. Also needing inhaler more with cold weather before going outside. Roughly 3 days ago pt woke up in the middle of the night with severe palpitations and R sided chest discomfort radiating down R arm. Symptoms lasted for roughly 3 hours. Went to [facility] ER but left AMA when she was not evaluated after 8 hrs. At advice of family, pt came to [facility] ER to evaluate prior symptoms. Of note, pt states she underwent TTE and stress test as part of pre-surgical work up Jan 2020 before undergoing Lung CA surgery. Endorses unchanged chronic cough. Last travel was to [place] in October. Does not having primary cardiologist. Denies any LE edema or fevers.     In ER: Given Benadryl 50mg and solumedrol 40mg IV, asa 324mg (21 Dec 2021 01:20)    62 yo F w/ PMH Lung cancer s/p resection and chemo, HTN, HLD, astham who presented wtih chest pain and [id number]. She notes on December 17th, she had palpitations that lasted for 3 hours and then felt chest tightness/pain which radiated to her right arm. She also felt diaphoretic. Eventually the symptoms resolved on its own. She went to urgent care who told her to go to the hospital due to an abnormal EKG but the wait at the original hospital was too long so she came to [facility] instead. She denied any further palpitations, chest pain. Denied SOB, lightheadedness, dizziness. Denies smoking, drinks socially, denies drug use.     EKG shows sinus rhythm wit   PMHx:   HTN (hypertension)    Lung cancer    COVID-19 vaccine series completed    Asthma        PSHx:   H/O pneumonectomy        Allergies:  IV Contrast (Vomiting)      Home Meds:    Current Medications:   acetaminophen     Tablet .. 650 milliGRAM(s) Oral every 6 hours PRN  guaiFENesin Oral Liquid (Sugar-Free) 100 milliGRAM(s) Oral once  heparin   Injectable 5000 Unit(s) SubCutaneous every 12 hours  melatonin 3 milliGRAM(s) Oral at bedtime PRN      FAMILY HISTORY:  No pertinent family history in first degree relatives        Social History:  Smoking History:  Alcohol Use:  Drug Use:    REVIEW OF SYSTEMS:  Constitutional:     [x ] negative [ ] fevers [ ] chills [ ] weight loss [ ] weight gain  HEENT:                  [x ] negative [ ] dry eyes [ ] eye irritation [ ] postnasal drip [ ] nasal congestion  CV:                         [ x] negative  [ ] chest pain [ ] orthopnea [ ] palpitations [ ] murmur  Resp:                     [x ] negative [ ] cough [ ] shortness of breath [ ] dyspnea [ ] wheezing [ ] sputum [ ]hemoptysis  GI:                          [ x] negative [ ] nausea [ ] vomiting [ ] diarrhea [ ] constipation [ ] abd pain [ ] dysphagia   :                        [ x] negative [ ] dysuria [ ] nocturia [ ] hematuria [ ] increased urinary frequency  Musculoskeletal: [x ] negative [ ] back pain [ ] myalgias [ ] arthralgias [ ] fracture  Skin:                       [ x] negative [ ] rash [ ] itch  Neurological:        [ x] negative [ ] headache [ ] dizziness [ ] syncope [ ] weakness [ ] numbness  Psychiatric:           [ x] negative [ ] anxiety [ ] depression  Endocrine:            [ x] negative [ ] diabetes [ ] thyroid problem  Heme/Lymph:      [ x] negative [ ] anemia [ ] bleeding problem  Allergic/Immune: [ x] negative [ ] itchy eyes [ ] nasal discharge [ ] hives [ ] angioedema    [ x] All other systems negative  [ ] Unable to assess ROS due to      Physical Exam:  T(F): 98.2 (12-22), Max: 98.2 (12-21)  HR: 76 (12-22) (76 - 93)  BP: 115/74 (12-22) (115/74 - 125/83)  RR: 18 (12-22)  SpO2: 96% (12-22)  GENERAL: No acute distress, well-developed  HEAD:  Atraumatic, Normocephalic  ENT: EOMI, PERRLA, conjunctiva and sclera clear, Neck supple, No JVD, moist mucosa  CHEST/LUNG: Clear to auscultation bilaterally; No wheeze, equal breath sounds bilaterally   BACK: No spinal tenderness  HEART: Regular rate and rhythm; No murmurs, rubs, or gallops  ABDOMEN: Soft, Nontender, Nondistended; Bowel sounds present  EXTREMITIES:  No clubbing, cyanosis, or edema  PSYCH: Nl behavior, nl affect  NEUROLOGY: AAOx3, non-focal, cranial nerves intact  SKIN: Normal color, No rashes or lesions  LINES:    Cardiovascular Diagnostic Testing:    ECG: Personally reviewed:    Echo: Personally reviewed:    Stress Testing:    Cath:    Imaging:    CXR: Personally reviewed    Labs: Personally reviewed                        12.2   7.14  )-----------( 219      ( 22 Dec 2021 07:54 )             37.5     12-22    140  |  101  |  25<H>  ----------------------------<  73  3.6   |  25  |  1.19    Ca    9.4      22 Dec 2021 07:53  Mg     1.9     12-21    TPro  7.0  /  Alb  4.1  /  TBili  0.2  /  DBili  x   /  AST  28  /  ALT  32  /  AlkPhos  105  12-21    PT/INR - ( 20 Dec 2021 16:32 )   PT: 12.6 sec;   INR: 1.05 ratio         PTT - ( 20 Dec 2021 16:32 )  PTT:34.3 sec  Serum Pro-Brain Natriuretic Peptide: 364 pg/mL (12-21 @ 05:04)        Thyroid Stimulating Hormone, Serum: 0.71 uIU/mL (12-21 @ 10:42)   Patient seen and evaluated at bedside    Chief Complaint:    HPI:  61F w/ hx of Lung CA s/p resection, and chemo, asthma, HTN, HLD p/w palpitations and atypical chest pain. Pt states she has been having intermittent chills overnight over the past several months. Also needing inhaler more with cold weather before going outside. Roughly 3 days ago pt woke up in the middle of the night with severe palpitations and R sided chest discomfort radiating down R arm. Symptoms lasted for roughly 3 hours. Went to [facility] ER but left AMA when she was not evaluated after 8 hrs. At advice of family, pt came to [facility] ER to evaluate prior symptoms. Of note, pt states she underwent TTE and stress test as part of pre-surgical work up Jan 2020 before undergoing Lung CA surgery. Endorses unchanged chronic cough. Last travel was to [place] in October. Does not having primary cardiologist. Denies any LE edema or fevers.     In ER: Given Benadryl 50mg and solumedrol 40mg IV, asa 324mg (21 Dec 2021 01:20)    60 yo F w/ PMH Lung cancer s/p resection and chemo, HTN, HLD, astham who presented wtih chest pain and [id number]. She notes on December 17th, she had palpitations that lasted for 3 hours and then felt chest tightness/pain which radiated to her right arm. She also felt diaphoretic. Eventually the symptoms resolved on its own. She went to urgent care who told her to go to the hospital due to an abnormal EKG but the wait at the original hospital was too long so she came to [facility] instead. She denied any further palpitations, chest pain. Denied SOB, lightheadedness, dizziness. Denies smoking, drinks socially, denies drug use.     EKG shows sinus rhythm with nonspecific T wave abnormality     PMHx:   HTN (hypertension)    Lung cancer    COVID-19 vaccine series completed    Asthma        PSHx:   H/O pneumonectomy        Allergies:  IV Contrast (Vomiting)      Home Meds:    Current Medications:   acetaminophen     Tablet .. 650 milliGRAM(s) Oral every 6 hours PRN  guaiFENesin Oral Liquid (Sugar-Free) 100 milliGRAM(s) Oral once  heparin   Injectable 5000 Unit(s) SubCutaneous every 12 hours  melatonin 3 milliGRAM(s) Oral at bedtime PRN      FAMILY HISTORY:  No pertinent family history in first degree relatives        Social History:  Smoking History:  Alcohol Use:  Drug Use:    REVIEW OF SYSTEMS:  Constitutional:     [x ] negative [ ] fevers [ ] chills [ ] weight loss [ ] weight gain  HEENT:                  [x ] negative [ ] dry eyes [ ] eye irritation [ ] postnasal drip [ ] nasal congestion  CV:                         [ x] negative  [ ] chest pain [ ] orthopnea [ ] palpitations [ ] murmur  Resp:                     [x ] negative [ ] cough [ ] shortness of breath [ ] dyspnea [ ] wheezing [ ] sputum [ ]hemoptysis  GI:                          [ x] negative [ ] nausea [ ] vomiting [ ] diarrhea [ ] constipation [ ] abd pain [ ] dysphagia   :                        [ x] negative [ ] dysuria [ ] nocturia [ ] hematuria [ ] increased urinary frequency  Musculoskeletal: [x ] negative [ ] back pain [ ] myalgias [ ] arthralgias [ ] fracture  Skin:                       [ x] negative [ ] rash [ ] itch  Neurological:        [ x] negative [ ] headache [ ] dizziness [ ] syncope [ ] weakness [ ] numbness  Psychiatric:           [ x] negative [ ] anxiety [ ] depression  Endocrine:            [ x] negative [ ] diabetes [ ] thyroid problem  Heme/Lymph:      [ x] negative [ ] anemia [ ] bleeding problem  Allergic/Immune: [ x] negative [ ] itchy eyes [ ] nasal discharge [ ] hives [ ] angioedema    [ x] All other systems negative  [ ] Unable to assess ROS due to      Physical Exam:  T(F): 98.2 (12-22), Max: 98.2 (12-21)  HR: 76 (12-22) (76 - 93)  BP: 115/74 (12-22) (115/74 - 125/83)  RR: 18 (12-22)  SpO2: 96% (12-22)  GENERAL: No acute distress, well-developed  ENT: No JVD   CHEST/LUNG: Clear to auscultation bilaterally; No wheeze, equal breath sounds bilaterally   HEART: Regular rate and rhythm; No murmurs, rubs, or gallops  EXTREMITIES:  No clubbing, cyanosis, or edema  PSYCH: Nl behavior, nl affect  NEUROLOGY: AAOx3, non-focal   SKIN: Normal color, No rashes or lesions  LINES:    Cardiovascular Diagnostic Testing:    ECG: Personally reviewed:    Echo: Personally reviewed:    Stress Testing:    Cath:    Imaging:    CXR: Personally reviewed    Labs: Personally reviewed                        12.2   7.14  )-----------( 219      ( 22 Dec 2021 07:54 )             37.5     12-22    140  |  101  |  25<H>  ----------------------------<  73  3.6   |  25  |  1.19    Ca    9.4      22 Dec 2021 07:53  Mg     1.9     12-21    TPro  7.0  /  Alb  4.1  /  TBili  0.2  /  DBili  x   /  AST  28  /  ALT  32  /  AlkPhos  105  12-21    PT/INR - ( 20 Dec 2021 16:32 )   PT: 12.6 sec;   INR: 1.05 ratio         PTT - ( 20 Dec 2021 16:32 )  PTT:34.3 sec  Serum Pro-Brain Natriuretic Peptide: 364 pg/mL (12-21 @ 05:04)        Thyroid Stimulating Hormone, Serum: 0.71 uIU/mL (12-21 @ 10:42)

## 2021-12-22 NOTE — DISCHARGE NOTE PROVIDER - NSFOLLOWUPCLINICS_GEN_ALL_ED_FT
[facility]  Cardiology  [address] [place] [id number]  Phone: [phone]  Fax:

## 2021-12-22 NOTE — PROGRESS NOTE ADULT - PROBLEM SELECTOR PLAN 1
Note TWIs, also present on EKG in pt's purse. CTA negative for PE. Given no outpt cardiologist and risk for CAD given presentation will tentatively plan for further cardiac risk stratification  -Telemetry  -TTE: wnl  -Nuclear pharmacologic stress test ordered  -TSH: wnl
Note TWIs, also present on EKG in pt's purse. CTA negative for PE. Nuclear pharm stress likely normal.  -Telemetry: sinus 80-90, ventriculary trigeminy for 3 beats  -TTE: wnl  -TSH: wnl  -appreciate cardiology c/s: outpatient holter

## 2021-12-22 NOTE — PROGRESS NOTE ADULT - PROBLEM SELECTOR PLAN 3
S/p resection and chemo. Has medport. As per pt and daughter, no recurrence on recent f/u CT.
S/p resection and chemo. Has medport. As per pt and daughter, no recurrence on recent f/u CT.
Attending Contribution to Care: I, Marleny Steinberg, performed the initial face to face bedside interview with this patient regarding history of present illness, review of symptoms and relevant past medical, social and family history.  I completed an independent physical examination.  I was the initial provider who evaluated this patient. I have signed out the follow up of any pending tests (i.e. labs, radiological studies) to the ACP.  I have communicated the patient’s plan of care and disposition with the ACP.

## 2021-12-22 NOTE — PROGRESS NOTE ADULT - PROBLEM SELECTOR PLAN 4
Recently stopped singulair  -Inhaler PRN  start pulmicort  outpatient f/u  stat covid pcr
Recently stopped singulair  -Inhaler PRN  -may benefit from inhaled steroid

## 2021-12-22 NOTE — DISCHARGE NOTE PROVIDER - NSRESEARCHGRANT_PROPHYLAXISRECOMFT_GEN_A_CORE
R shoulder high grade partial thickness supraspinatous tear, bicep tenopathy, subscap tear No post-discharge thromboprophylaxis indicated.

## 2021-12-22 NOTE — PROGRESS NOTE ADULT - PROBLEM SELECTOR PLAN 2
Unclear baseline renal function. Pt not aware of having CKD.  -Holding ACE and HCTZ for now  -Trend BMP: creatinine downtrending today  -Renal dose meds
Unclear baseline renal function. Pt not aware of having CKD.  -Holding ACE and HCTZ for now  -Trend BMP: creatinine downtrending today

## 2022-01-06 PROCEDURE — 82607 VITAMIN B-12: CPT

## 2022-01-06 PROCEDURE — 83550 IRON BINDING TEST: CPT

## 2022-01-06 PROCEDURE — 80048 BASIC METABOLIC PNL TOTAL CA: CPT

## 2022-01-06 PROCEDURE — 85027 COMPLETE CBC AUTOMATED: CPT

## 2022-01-06 PROCEDURE — 85730 THROMBOPLASTIN TIME PARTIAL: CPT

## 2022-01-06 PROCEDURE — 71275 CT ANGIOGRAPHY CHEST: CPT | Mod: MA

## 2022-01-06 PROCEDURE — 84443 ASSAY THYROID STIM HORMONE: CPT

## 2022-01-06 PROCEDURE — 93017 CV STRESS TEST TRACING ONLY: CPT

## 2022-01-06 PROCEDURE — 71045 X-RAY EXAM CHEST 1 VIEW: CPT

## 2022-01-06 PROCEDURE — 93005 ELECTROCARDIOGRAM TRACING: CPT

## 2022-01-06 PROCEDURE — 82728 ASSAY OF FERRITIN: CPT

## 2022-01-06 PROCEDURE — 85025 COMPLETE CBC W/AUTO DIFF WBC: CPT

## 2022-01-06 PROCEDURE — U0005: CPT

## 2022-01-06 PROCEDURE — 96374 THER/PROPH/DIAG INJ IV PUSH: CPT | Mod: XU

## 2022-01-06 PROCEDURE — 96375 TX/PRO/DX INJ NEW DRUG ADDON: CPT

## 2022-01-06 PROCEDURE — 94640 AIRWAY INHALATION TREATMENT: CPT

## 2022-01-06 PROCEDURE — U0003: CPT

## 2022-01-06 PROCEDURE — 83735 ASSAY OF MAGNESIUM: CPT

## 2022-01-06 PROCEDURE — 78452 HT MUSCLE IMAGE SPECT MULT: CPT

## 2022-01-06 PROCEDURE — C8929: CPT

## 2022-01-06 PROCEDURE — 82746 ASSAY OF FOLIC ACID SERUM: CPT

## 2022-01-06 PROCEDURE — 99285 EMERGENCY DEPT VISIT HI MDM: CPT

## 2022-01-06 PROCEDURE — 85045 AUTOMATED RETICULOCYTE COUNT: CPT

## 2022-01-06 PROCEDURE — 86803 HEPATITIS C AB TEST: CPT

## 2022-01-06 PROCEDURE — 83540 ASSAY OF IRON: CPT

## 2022-01-06 PROCEDURE — 84484 ASSAY OF TROPONIN QUANT: CPT

## 2022-01-06 PROCEDURE — 85610 PROTHROMBIN TIME: CPT

## 2022-01-06 PROCEDURE — 80053 COMPREHEN METABOLIC PANEL: CPT

## 2022-01-06 PROCEDURE — 36415 COLL VENOUS BLD VENIPUNCTURE: CPT

## 2022-01-06 PROCEDURE — 83880 ASSAY OF NATRIURETIC PEPTIDE: CPT

## 2022-06-10 NOTE — CONSULT NOTE ADULT - CONSULT REASON
Future Appointments    This patient does not currently have any appointments scheduled.     Past Visits    Date Provider Specialty Visit Type Primary Dx   04/19/2021 Jefry Riddle MD Family Medicine Office Visit [facility]ule, [facility]   02/23/2021 ELENI Villafana - NP Family Medicine Virtual Visit URI with cough and congestion         LMTCB & schedule cp/abn stress

## 2024-08-27 NOTE — DISCHARGE NOTE PROVIDER - YES NO FOR [facility] POSITIVE OR NEGATIVE COVID RESULT
[History reviewed] : History reviewed. [Medications and Allergies reviewed] : Medications and allergies reviewed. .